# Patient Record
Sex: MALE | Race: WHITE | NOT HISPANIC OR LATINO | Employment: UNEMPLOYED | URBAN - METROPOLITAN AREA
[De-identification: names, ages, dates, MRNs, and addresses within clinical notes are randomized per-mention and may not be internally consistent; named-entity substitution may affect disease eponyms.]

---

## 2020-11-04 DIAGNOSIS — R97.20 ELEVATED PSA: Primary | ICD-10-CM

## 2020-11-11 ENCOUNTER — OFFICE VISIT (OUTPATIENT)
Dept: UROLOGY | Facility: CLINIC | Age: 60
End: 2020-11-11
Payer: COMMERCIAL

## 2020-11-11 VITALS
HEART RATE: 106 BPM | HEIGHT: 75 IN | RESPIRATION RATE: 18 BRPM | BODY MASS INDEX: 26.73 KG/M2 | WEIGHT: 215 LBS | DIASTOLIC BLOOD PRESSURE: 84 MMHG | SYSTOLIC BLOOD PRESSURE: 120 MMHG

## 2020-11-11 DIAGNOSIS — R97.20 ELEVATED PSA: Primary | ICD-10-CM

## 2020-11-11 LAB — POC RESIDUAL URINE VOLUME: 52 ML (ref 0–100)

## 2020-11-11 PROCEDURE — 99204 PR OFFICE/OUTPT VISIT, NEW, LEVL IV, 45-59 MIN: ICD-10-PCS | Mod: S$GLB,,, | Performed by: UROLOGY

## 2020-11-11 PROCEDURE — 99204 OFFICE O/P NEW MOD 45 MIN: CPT | Mod: S$GLB,,, | Performed by: UROLOGY

## 2020-11-11 PROCEDURE — 51798 POCT BLADDER SCAN: ICD-10-PCS | Mod: S$GLB,,, | Performed by: UROLOGY

## 2020-11-11 PROCEDURE — 51798 US URINE CAPACITY MEASURE: CPT | Mod: S$GLB,,, | Performed by: UROLOGY

## 2020-11-11 RX ORDER — ASPIRIN 81 MG/1
81 TABLET ORAL
COMMUNITY

## 2020-11-11 RX ORDER — ALPRAZOLAM 0.5 MG/1
TABLET ORAL
COMMUNITY
Start: 2020-10-15

## 2020-11-11 RX ORDER — LISINOPRIL 5 MG/1
5 TABLET ORAL DAILY
COMMUNITY
Start: 2020-10-15

## 2020-11-11 RX ORDER — ATORVASTATIN CALCIUM 40 MG/1
40 TABLET, FILM COATED ORAL
COMMUNITY

## 2020-11-11 RX ORDER — QUETIAPINE FUMARATE 200 MG/1
200 TABLET, FILM COATED ORAL
COMMUNITY

## 2020-11-11 RX ORDER — PAROXETINE 10 MG/1
10 TABLET, FILM COATED ORAL DAILY
COMMUNITY
Start: 2020-10-15

## 2020-11-11 NOTE — PROGRESS NOTES
Pt seen chart reviewed case discussed with Ben.  Pt has an elevated psa and a positive family history(brother) with prostate cancer.  Patient would like to proceed with MRI of prostate before doing a biopsy.  Pt explained that there is some associated false positive and false negative rates associated with an MRI.  We will proceed with an MRI and contact pt tp fu afterwards

## 2020-11-11 NOTE — PROGRESS NOTES
Subjective:       Patient ID: Yadira Ro is a 60 y.o. male.    Chief Complaint: New Pt and Elevated PSA (PSA (10-29-20): 5.43)      HPI: Benign year old male new to service Dr. Stephen mullen referred for elevated PSA.  Patient had a PSA drawn for a wellness check by his PCP on October 29, 2020 resulting in of 5.43.  Patient has a brother with prostate cancer.  Patient denies any urologic complaints.  He states he is voiding with a good stream runs from beginning to end without hesitation feels empty to completion.  He denies any dysuria, frequency, urgency, incontinence or gross hematuria.  No flank pain.  Erections are strong and able to maintain to completion.  No pain with ejaculation and no blood in the semen.       Past Medical History: History reviewed. No pertinent past medical history.    Past Surgical Historical: History reviewed. No pertinent surgical history.     Medications:   Medication List with Changes/Refills   Current Medications    ALPRAZOLAM (XANAX) 0.5 MG TABLET    TAKE ONE TABLET BY MOUTH ONCE NIGHTLY    ASPIRIN (ECOTRIN) 81 MG EC TABLET    Take 81 mg by mouth.    ATORVASTATIN (LIPITOR) 40 MG TABLET    Take 40 mg by mouth.    LISINOPRIL (PRINIVIL,ZESTRIL) 5 MG TABLET    Take 5 mg by mouth once daily.    PAROXETINE (PAXIL) 10 MG TABLET    Take 10 mg by mouth once daily.    QUETIAPINE (SEROQUEL) 200 MG TAB    Take 200 mg by mouth.        Past Social History:   Social History     Socioeconomic History    Marital status:      Spouse name: Not on file    Number of children: Not on file    Years of education: Not on file    Highest education level: Not on file   Occupational History    Not on file   Social Needs    Financial resource strain: Not on file    Food insecurity     Worry: Not on file     Inability: Not on file    Transportation needs     Medical: Not on file     Non-medical: Not on file   Tobacco Use    Smoking status: Current Every Day Smoker   Substance and Sexual Activity     Alcohol use: Not on file    Drug use: Not on file    Sexual activity: Not on file   Lifestyle    Physical activity     Days per week: Not on file     Minutes per session: Not on file    Stress: Not on file   Relationships    Social connections     Talks on phone: Not on file     Gets together: Not on file     Attends [employer] service: Not on file     Active member of club or organization: Not on file     Attends meetings of clubs or organizations: Not on file     Relationship status: Not on file   Other Topics Concern    Not on file   Social History Narrative    Not on file       Allergies: Review of patient's allergies indicates:  No Known Allergies     Family History: History reviewed. No pertinent family history.     Review of Systems:  Review of Systems   Constitutional: Negative for activity change and appetite change.   HENT: Negative for congestion and dental problem.    Eyes: Negative for visual disturbance.   Respiratory: Negative for chest tightness and shortness of breath.    Cardiovascular: Negative for chest pain.   Gastrointestinal: Negative for abdominal distention and abdominal pain.   Genitourinary: Negative for decreased urine volume, difficulty urinating, discharge, dysuria, enuresis, flank pain, frequency, genital sores, hematuria, penile pain, penile swelling, scrotal swelling, testicular pain and urgency.   Musculoskeletal: Negative for back pain and neck pain.   Skin: Negative for color change.   Neurological: Negative for dizziness.   Hematological: Negative for adenopathy.   Psychiatric/Behavioral: Negative for agitation, behavioral problems and confusion.       Physical Exam:  Physical Exam  Constitutional:       Appearance: He is well-developed.   HENT:      Head: Normocephalic.   Eyes:      General: No scleral icterus.  Neck:      Musculoskeletal: Normal range of motion.   Pulmonary:      Effort: Pulmonary effort is normal.      Breath sounds: Normal breath sounds.    Abdominal:      General: There is no distension.      Palpations: Abdomen is soft.      Tenderness: There is no abdominal tenderness.      Hernia: No hernia is present. There is no hernia in the right inguinal area or left inguinal area.   Genitourinary:     Penis: Normal.       Scrotum/Testes: Normal. Cremasteric reflex is present.      Comments: Patient refused TOMER  Skin:     General: Skin is warm and dry.   Neurological:      Mental Status: He is alert and oriented to person, place, and time.         Assessment/Plan:   Elevated PSA in a patient with a first-degree relative (brother) with prostate cancer.  PSA 5.43 on a wellness check.  Patient refused TOMER exam today.  Discussed biopsy for further evaluation of the PSA elevation and rule out prostate cancer, patient declines that he would prefer an MRI after speaking with the physician friend of his.  Urinalysis is negative PVR is 52 mL.  MRI the prostate is ordered for further evaluation of the prostate per patient request at after discussion with Dr. Mitchell  Problem List Items Addressed This Visit     None      Visit Diagnoses     Elevated PSA    -  Primary    Relevant Orders    POCT Urinalysis (w/Micro Option)    POCT Bladder Scan    PSA, Total and Free    Testosterone

## 2020-11-11 NOTE — LETTER
November 11, 2020      Bella Taylor, NP  [address] [facility] [address]           [facility]  [facility] [address]  Phone: [phone]  Fax: [phone]          Patient: Yadira Ro   MR Number: [id number]   YOB: 1960   Date of Visit: 11/11/2020       Dear Bella Taylor:    Thank you for referring Yadira Ro to me for evaluation. Attached you will find relevant portions of my assessment and plan of care.    If you have questions, please do not hesitate to call me. I look forward to following Yadira Ro along with you.    Sincerely,    James Mitchell MD    Enclosure  CC:  No Recipients    If you would like to receive this communication electronically, please contact [email or web address] or [phone] to request more information on [employer] access.    For providers and/or their staff who would like to refer a patient to [facility], please contact us through our one-stop-shop provider referral line, [facility], at [phone].    If you feel you have received this communication in error or would no longer like to receive these types of communications, please e-mail [email or web address]

## 2020-11-11 NOTE — LETTER
November 11, 2020        Bella Taylor, DEANDRE  [address] [facility] [address]             [facility]  [address] [facility] [address]  [facility] [address]  Phone: [phone]  Fax: [phone]   Patient: Yadira Ro   MR Number: [id number]   YOB: 1960   Date of Visit: 11/11/2020       Dear Dr. Taylor:    Thank you for referring Yadira Ro to me for evaluation. Below are the relevant portions of my assessment and plan of care.            If you have questions, please do not hesitate to call me. I look forward to following Yadira along with you.    Sincerely,      James Mitchell MD           CC  No Recipients

## 2020-11-16 LAB
PROSTATE SPECIFIC ANTIGEN, TOTAL: 4 NG/ML
PSA FREE MFR SERPL: 10 % (CALC)
PSA FREE SERPL-MCNC: 0.4 NG/ML

## 2020-11-20 ENCOUNTER — TELEPHONE (OUTPATIENT)
Dept: UROLOGY | Facility: CLINIC | Age: 60
End: 2020-11-20

## 2020-11-20 NOTE — TELEPHONE ENCOUNTER
I spoke to pt's wife and she informed me that during the office visit  you told the pt that no matter what the MRI said a biopsy would still be done. Due to money being tight now they do not want to have a MRI but go right to biopsy. Mr. May also states that he does not want to have the biopsy unless he is completely put under. He had a bad vas experience and does not want to feel any pain. Please advise

## 2020-11-20 NOTE — TELEPHONE ENCOUNTER
----- Message from Maddie Collins LPN sent at 11/19/2020  2:52 PM CST -----    ----- Message -----  From: Analia Garcia  Sent: 11/19/2020   2:39 PM CST  To: Stephen Ramirez Staff    Type:  Patient Returning Call    Who Called:mrs Ro    Who Left Message for Patient:rebekah  Does the patient know what this is regarding?:procedure code   Would the patient rather a call back or a response via [facility]? Callback   Best Call Back Number:[phone]  Additional Information:

## 2021-01-05 ENCOUNTER — HISTORICAL (OUTPATIENT)
Dept: CASE MANAGEMENT | Facility: HOSPITAL | Age: 61
End: 2021-01-05

## 2021-01-05 ENCOUNTER — HISTORICAL (OUTPATIENT)
Dept: CARDIOLOGY | Facility: HOSPITAL | Age: 61
End: 2021-01-05

## 2021-01-05 LAB
ABS NEUT (OLG): 3.5 X10(3)/MCL (ref 2.1–9.2)
APTT PPP: 30.2 SECOND(S) (ref 23.2–33.7)
BASOPHILS # BLD AUTO: 0 X10(3)/MCL (ref 0–0.2)
BASOPHILS NFR BLD AUTO: 1 %
BUN SERPL-MCNC: 15.5 MG/DL (ref 8.4–25.7)
CALCIUM SERPL-MCNC: 9.2 MG/DL (ref 8.4–10.2)
CHLORIDE SERPL-SCNC: 107 MMOL/L (ref 98–107)
CO2 SERPL-SCNC: 27 MMOL/L (ref 22–29)
CREAT SERPL-MCNC: 1.05 MG/DL (ref 0.73–1.18)
CREAT/UREA NIT SERPL: 15
EOSINOPHIL # BLD AUTO: 0.2 X10(3)/MCL (ref 0–0.9)
EOSINOPHIL NFR BLD AUTO: 4 %
ERYTHROCYTE [DISTWIDTH] IN BLOOD BY AUTOMATED COUNT: 12.5 % (ref 11.5–17)
EST. AVERAGE GLUCOSE BLD GHB EST-MCNC: 91.1 MG/DL
GLUCOSE SERPL-MCNC: 65 MG/DL (ref 74–100)
HBA1C MFR BLD: 4.8 %
HCT VFR BLD AUTO: 42.6 % (ref 42–52)
HGB BLD-MCNC: 13.9 GM/DL (ref 14–18)
INR PPP: 1.1 (ref 0–1.3)
LYMPHOCYTES # BLD AUTO: 1.9 X10(3)/MCL (ref 0.6–4.6)
LYMPHOCYTES NFR BLD AUTO: 30 %
MAGNESIUM SERPL-MCNC: 2.1 MG/DL (ref 1.6–2.6)
MCH RBC QN AUTO: 34.5 PG (ref 27–31)
MCHC RBC AUTO-ENTMCNC: 32.6 GM/DL (ref 33–36)
MCV RBC AUTO: 105.7 FL (ref 80–94)
MONOCYTES # BLD AUTO: 0.8 X10(3)/MCL (ref 0.1–1.3)
MONOCYTES NFR BLD AUTO: 12 %
NEUTROPHILS # BLD AUTO: 3.5 X10(3)/MCL (ref 2.1–9.2)
NEUTROPHILS NFR BLD AUTO: 54 %
PLATELET # BLD AUTO: 218 X10(3)/MCL (ref 130–400)
PMV BLD AUTO: 9.9 FL (ref 9.4–12.4)
POTASSIUM SERPL-SCNC: 4.1 MMOL/L (ref 3.5–5.1)
PROTHROMBIN TIME: 13.6 SECOND(S) (ref 11.1–13.7)
RBC # BLD AUTO: 4.03 X10(6)/MCL (ref 4.7–6.1)
SODIUM SERPL-SCNC: 142 MMOL/L (ref 136–145)
WBC # SPEC AUTO: 6.5 X10(3)/MCL (ref 4.5–11.5)

## 2021-09-10 ENCOUNTER — TELEPHONE (OUTPATIENT)
Dept: UROLOGY | Facility: CLINIC | Age: 61
End: 2021-09-10

## 2022-04-30 NOTE — DISCHARGE SUMMARY
Patient:   Yadira Ro             MRN: [id number]            FIN: [id number]               Age:   60 years     Sex:  Male     :  1960   Associated Diagnoses:   None   Author:   Trudy Jeronimo NP      See H & P  Admitted for LLE swelling. Documented DVT in LLE. Venogram cancelled due to LLE improvement. will follow up as outpatient.

## 2022-04-30 NOTE — H&P
Patient:   Yadira Ro             MRN: [id number]            FIN: [id number]               Age:   60 years     Sex:  Male     :  1960   Associated Diagnoses:   None   Author:   Leroy Kiran MD      Basic Information   Admit information:   .    Source of history:  Medical record.    Referral source   History limitation:  None.       Chief Complaint   LLE pain and swelling      History of Present Illness   Ms. Fritz Ro is a 60 year old male who is known to AURELIO Fraire. He presented to [facility] as a direct admit for LLE DVT. He complained LLE swelling with calf tenderness. He have chronic shortness of breath.  He was started Eliquis 10 mg BID x 7 days on 21. He was admitted to [facility] for acute LLE DVT.    PMH: Acute LLE DVT (Eliquis), HTN, HLD, Insomnia, Anxiety, Hypoglycemia, SOB (chronic)  PSH: Biolateral excision of tonsil nec, Arm  FH: Mother- DM type 2 with insulin  SH: Denies smoking. unknown alcohol and illicit drug use    Studies/Procedures  Lower Extremity Venous US Report  21  The study quality is good.   A partially occlusive thrombus is noted in the veins of the left lower extremity starting at the distal common femoral and extending into the infra-popliteal veins with an occlusive thrombus noted in the left mid femoral vein.   MD aware of critical findings. Started on eliquis. Transferred to ER    Lipid Panel 10.29.20  Cholesterol            210  Triglycerides     291  HDL Cholesterol    57  Calc HDL Chol       111           Review of Systems   Constitutional:  Negative.    Respiratory:  Negative.    Cardiovascular:  Peripheral edema.    Gastrointestinal:  Negative.    Genitourinary:  Negative.    Musculoskeletal:  LLE swelling and pain.    Neurologic:  Alert and oriented X4.    Psychiatric:  Negative.       Health Status   Allergies:    Allergic Reactions (Selected)  No Known Medication Allergies,    Allergies (1) Active Reaction  No Known Medication Allergies None  Documented     Current medications:  (Selected)   Inpatient Medications  Ordered  Benadryl: 25 mg, form: Cap, Oral, Once a day (at bedtime) PRN for insomnia, first dose 01/05/21 20:58:00 CST, STAT  Benadryl: 25 mg, form: Cap, Oral, q4hr PRN for itching, first dose 01/05/21 20:58:00 CST, STAT  Maalox Antacid with Anti-Gas: 30 mL, form: Susp, Oral, q4hr PRN for dyspepsia, first dose 01/05/21 20:58:00 CST  Milk of Magnesia: 30 mL, form: Susp, Oral, Daily PRN for constipation, first dose 01/05/21 20:58:00 CST  Norco  5 mg-325 mg oral tablet: 1 tab(s), form: Tab, Oral, q4hr PRN for pain, moderate, first dose 01/05/21 20:58:00 CST  Norco  5 mg-325 mg oral tablet: 2 tab(s), form: Tab, Oral, q4hr PRN for pain, severe, first dose 01/05/21 20:58:00 CST  SEROquel XR: 300 mg, form: Tab-ER, Oral, At Bedtime, first dose 01/06/21 0:47:00 CST, STAT  Senokot S: 1 tab(s), form: Tab, Oral, BID PRN for constipation, first dose 01/05/21 20:58:00 CST, choose only if unrelieved by Milk of Magnesia or choose first if ordered alone  Tylenol: 1,000 mg, form: Tab, Oral, q6hr PRN for pain, mild, first dose 01/05/21 20:58:00 CST, STAT, or fever; No more than 4 grams in 24 hours  Tylenol: 650 mg, form: Supp, MA, q6hr PRN for pain, mild, first dose 01/05/21 20:58:00 CST, STAT, mild or fever if patient unable to swallow; No more than 4 grams in 24 hours  Xanax: 0.25 mg, form: Tab, Oral, q6hr PRN for anxiety, first dose 01/05/21 20:58:00 CST, STAT  Xylocaine Jelly 2% topical gel with applicator: 5 mL, form: Gel, TOP, Once PRN for other (see comment), first dose 01/05/21 20:58:00 CST, for insertion of urinary catheter in males  hydrALAZINE: 10 mg, form: Injection, IV Push, q2hr PRN for hypertension, first dose 01/05/21 20:58:00 CST, SBP > 160mmHg or DBP > 100 mmHg, if HR < 60 or when BP does not respond to Labetolol  labetalol: 10 mg, form: Soln, IV Push, q1hr PRN for hypertension, first dose 01/05/21 20:58:00 CST, SBP > 160mmHg or DBP > 110 mmHg,  may repeat hourly as necessary if HR > 60  nitroglycerin: 0.4 mg, form: Tab-SL, SL, q5min PRN for chest pain, Order duration: 3 dose(s), first dose 01/05/21 20:58:00 CST, stop date Limited # of times, STAT, if systolic BP > 100 until pain relieved of at level 1  Documented Medications  Documented  Eliquis 5 mg oral tablet: 10 mg = 2 tab(s), Oral, BID, # 30 tab(s), 0 Refill(s)  Seroquel 300 mg oral tablet: 300 mg = 1 tab(s), Oral, Once a day (at bedtime), # 90 tab(s), 0 Refill(s)  Xanax 0.5 mg oral tablet: 1 tab, Oral, Daily, 0 Refill(s),    Medications (15) Active  Scheduled: (1)  quetiapine 50 mg ERTab- UD  300 mg 6 tab(s), Oral, At Bedtime  Continuous: (0)  PRN: (14)  acetaminophen 500 mg Tab  1,000 mg 2 tab(s), Oral, q6hr  acetaminophen 650 mg Sup UD  650 mg 1 supp, DC, q6hr  Al hydrox/Mg hydrox/simeth -400-40 mg/5 mL Emily UD  30 mL, Oral, q4hr  alprazolam 0.25 mg Tab UD  0.25 mg 1 tab(s), Oral, q6hr  diphenhydrAMINE 25 mg Cap UD  25 mg 1 cap(s), Oral, q4hr  diphenhydrAMINE 25 mg Cap UD  25 mg 1 cap(s), Oral, Once a day (at bedtime)  docusate-senna 50mg -8.6mg Tab UD  1 tab(s), Oral, BID  hydrALAzine 20 mg/ml Inj- 1 mL  10 mg 0.5 mL, IV Push, q2hr  hydrocodone 5mg/APAP 325 mg  1 tab(s), Oral, q4hr  hydrocodone 5mg/APAP 325 mg  2 tab(s), Oral, q4hr  labetalol 5 mg/mL 20mL vial  10 mg 2 mL, IV Push, q1hr  lidocaine topical 2% Jelly -5mL  5 mL, TOP, Once  magnesium hydroxide 8% Emily (MOM) UD  30 mL, Oral, Daily  Nitroglycerin 0.4 mg TAB  0.4 mg 1 tab(s), SL, q5min        Physical Examination   General:  Alert and oriented, No acute distress.    Eye:  Extraocular movements are intact.    HENT:  Normocephalic.    Neck:  Supple.    Respiratory:  Lungs are clear to auscultation, Respirations are non-labored.    Cardiovascular:  Normal rate, Regular rhythm, Good pulses equal in all extremities.         Edema: Left, Leg, 1+.    Gastrointestinal:  Soft, Non-distended, Normal bowel sounds.       Vital Signs (last 24  hrs)_____  Last Charted___________  Temp Oral     36.5 DegC  (JAN 05 21:00)  Heart Rate Peripheral   82 bpm  (JAN 06 08:00)  Resp Rate         17 br/min  (JAN 05 21:00)  SBP      134 mmHg  (JAN 06 08:00)  DBP      86 mmHg  (JAN 06 08:00)  SpO2      L 93%  (JAN 06 08:00)     Measurements from flowsheet : Measurements   1/6/2021 4:52 CST        Weight Dosing             95.5 kg                             Weight Measured and Calculated in Lbs     210.54 lb                             Height/Length Dosing      190 cm    1/5/2021 21:00 CST       Weight Dosing             95.5 kg                             Weight Measured and Calculated in Lbs     210.54 lb                             Weight Estimated          95.5 kg                             Height/Length Dosing      190 cm                             Height/Length Estimated   190 cm                             Body Mass Index Estimated 26.45 kg/m2     Musculoskeletal:  Normal range of motion.    Integumentary:  Warm, Dry.    Neurologic:  Alert, Oriented.    Psychiatric:  Cooperative, Appropriate mood & affect, Normal judgment.       Review / Management   Laboratory Results   Today's Lab Results : PowerNote Discrete Results   1/5/2021 22:02 CST       WBC                       6.5 x10(3)/mcL                             RBC                       4.03 x10(6)/mcL  LOW                             Hgb                       13.9 gm/dL  LOW                             Hct                       42.6 %                             Platelet                  218 x10(3)/mcL                             Sodium Lvl                142 mmol/L                             Potassium Lvl             4.1 mmol/L                             Chloride                  107 mmol/L                             CO2                       27 mmol/L                             Calcium Lvl               9.2 mg/dL                             Magnesium Lvl             2.10 mg/dL                              Glucose Lvl               65 mg/dL  LOW                             BUN                       15.5 mg/dL                             Creatinine                1.05 mg/dL                             BUN/Creat Ratio           15  NA                             eGFR-AA                   >60  NA                             eGFR-DARIUSZ                  >60 mL/min/1.73 m2  NA        Condition:  Stable.       Impression and Plan   Impression  Acute LLE DVT- distal common femoral and extending into the infra-popliteal veins with occlusive thrombus noted in the mid femoral vein (US on 01.05.21)   Started on Eliquis 10 mg BID x 7 days 01.05.21  Chronic shortness of breath  HTN  HLD  noncompliant with lovastatin for months  Anemia  Anxiety    Plan  Cancelled venogram today  Resume Eliquis 10 mg po BID  for total 7 days then Eliquis 5 mg po BID for preventive measures   Resume Lisinopril 10 mg po   Discharge home today  Denies GI Bleed history    DISCHARGE PLAN:  Discharge: Home  Discharge Diet: Cardiac, Low Sodium  Discharge Condition: Stable  Discharge Activity: As Tolerated  Follow up with Dr. Kiran on 01.12.21 @ 1520  CBC  (new patient on eliquis) and CMP (restarting statin) in week    Discharge Medications:   Lovastatin  Eliquis 10 mg po BID  for total 7 days then Eliquis 5 mg po BID   Lisinopril 10 mg po   Xanx 0.5 mg po qd  NTG sl prn cp  Seroquel 300 mg qhs

## 2022-09-20 ENCOUNTER — HISTORICAL (OUTPATIENT)
Dept: ADMINISTRATIVE | Facility: HOSPITAL | Age: 62
End: 2022-09-20

## 2022-10-28 ENCOUNTER — HISTORICAL (OUTPATIENT)
Dept: ADMINISTRATIVE | Facility: HOSPITAL | Age: 62
End: 2022-10-28